# Patient Record
Sex: MALE | Race: WHITE
[De-identification: names, ages, dates, MRNs, and addresses within clinical notes are randomized per-mention and may not be internally consistent; named-entity substitution may affect disease eponyms.]

---

## 2019-07-19 ENCOUNTER — HOSPITAL ENCOUNTER (OUTPATIENT)
Dept: HOSPITAL 47 - ORWHC2ENDO | Age: 59
Discharge: HOME | End: 2019-07-19
Attending: SURGERY
Payer: COMMERCIAL

## 2019-07-19 VITALS — SYSTOLIC BLOOD PRESSURE: 122 MMHG | HEART RATE: 85 BPM | DIASTOLIC BLOOD PRESSURE: 62 MMHG

## 2019-07-19 VITALS — RESPIRATION RATE: 18 BRPM | TEMPERATURE: 97.2 F

## 2019-07-19 VITALS — BODY MASS INDEX: 31.5 KG/M2

## 2019-07-19 DIAGNOSIS — I10: ICD-10-CM

## 2019-07-19 DIAGNOSIS — Z79.899: ICD-10-CM

## 2019-07-19 DIAGNOSIS — E78.5: ICD-10-CM

## 2019-07-19 DIAGNOSIS — M72.2: ICD-10-CM

## 2019-07-19 DIAGNOSIS — M10.9: ICD-10-CM

## 2019-07-19 DIAGNOSIS — R19.5: Primary | ICD-10-CM

## 2019-07-19 DIAGNOSIS — K64.4: ICD-10-CM

## 2019-07-19 PROCEDURE — 45378 DIAGNOSTIC COLONOSCOPY: CPT

## 2019-07-19 NOTE — P.GSHP
History of Present Illness


H&P Date: 07/19/19


Chief Complaint: Screening colonoscopy





This is a 58-year-old male who presents today for screening colonoscopy.  

Patient denies a significant GI complaints.





Past Medical History


Past Medical History: Hyperlipidemia, Hypertension


Additional Past Medical History / Comment(s): rt foot gout.  pos. cologuard.  lt

foot plantar fasciitis


History of Any Multi-Drug Resistant Organisms: None Reported


Past Surgical History: No Surgical Hx Reported


Past Anesthesia/Blood Transfusion Reactions: No Reported Reaction


Additional Past Anesthesia/Blood Transfusion Reaction / Comment(s): has never 

had anesthesia


Smoking Status: Never smoker





- Past Family History


  ** Father


Family Medical History: Cancer





Medications and Allergies


                                Home Medications











 Medication  Instructions  Recorded  Confirmed  Type


 


Allopurinol [Zyloprim] 100 mg PO DAILY 07/17/19 07/17/19 History


 


Atorvastatin [Lipitor] 40 mg PO DAILY 07/17/19 07/17/19 History


 


Lisinopril 40 mg PO DAILY 07/17/19 07/17/19 History


 


Zolpidem [Ambien] 5 mg PO HS 07/17/19 07/17/19 History








                                    Allergies











Allergy/AdvReac Type Severity Reaction Status Date / Time


 


No Known Allergies Allergy   Verified 07/17/19 11:39














Surgical - Exam


                                   Vital Signs











Temp Pulse Resp BP Pulse Ox


 


 97.2 F L  89   18   168/92   96 


 


 07/19/19 07:09  07/19/19 07:09  07/19/19 07:09  07/19/19 07:09  07/19/19 07:09














- General


well developed, well nourished, no distress





- Eyes


PERRL





- ENT


normal pinna





- Neck


no masses





- Respiratory


normal expansion





- Cardiovascular


Rhythm: regular





- Abdomen


Abdomen: soft, non tender





Assessment and Plan


Assessment: 





We'll perform screening colonoscopy.

## 2019-07-19 NOTE — P.OP
Date of Procedure: 07/19/19


Preoperative Diagnosis: 


Screening colonoscopy


Postoperative Diagnosis: 


Normal colonoscopy





External hemorrhoids


Procedure(s) Performed: 


Colonoscopy


Anesthesia: MAC


Surgeon: Tigre Tran


Pathology: none sent


Condition: stable


Disposition: PACU


Description of Procedure: 





PROCEDURE:  The patient was placed on the endoscopy table in the lateral 

position.  Digital rectal examination was performed which revealed external 

hemorrhoids.  The prostate was symmetrical without nodules.  Flexible 

colonoscope was then placed in the patient's anus and passed throughout the 

entire colon.  The ileocecal valve was visualized.  The cecum, ascending, 

transverse, descending and sigmoid colon were normal.  The rectum was normal as 

well.  There were no masses, polyps or diverticula noted in the entire colon. 





SUMMARY OF FINDINGS:  


Normal colonoscopy.  External hemorrhoids.

## 2020-08-13 ENCOUNTER — HOSPITAL ENCOUNTER (EMERGENCY)
Dept: HOSPITAL 47 - EC | Age: 60
Discharge: HOME | End: 2020-08-13
Payer: COMMERCIAL

## 2020-08-13 VITALS — TEMPERATURE: 98.3 F | SYSTOLIC BLOOD PRESSURE: 131 MMHG | HEART RATE: 71 BPM | DIASTOLIC BLOOD PRESSURE: 72 MMHG

## 2020-08-13 VITALS — RESPIRATION RATE: 18 BRPM

## 2020-08-13 DIAGNOSIS — S93.401A: Primary | ICD-10-CM

## 2020-08-13 DIAGNOSIS — I10: ICD-10-CM

## 2020-08-13 DIAGNOSIS — Z87.891: ICD-10-CM

## 2020-08-13 DIAGNOSIS — E78.5: ICD-10-CM

## 2020-08-13 DIAGNOSIS — Z79.899: ICD-10-CM
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
## 2020-08-13 DIAGNOSIS — Z79.51: ICD-10-CM

## 2020-08-13 PROCEDURE — 29515 APPLICATION SHORT LEG SPLINT: CPT

## 2020-08-13 PROCEDURE — 99283 EMERGENCY DEPT VISIT LOW MDM: CPT

## 2020-08-13 NOTE — ED
Lower Extremity Injury HPI





- General


Chief Complaint: Extremity Injury, Lower


Stated Complaint: R Ankle Injury


Time Seen by Provider: 08/13/20 08:23


Source: patient, family


Mode of arrival: wheelchair


Limitations: no limitations





- History of Present Illness


Initial Comments: 





Patient is a 59-year-old male presenting to emergency Department with complaints

of right ankle pain.  Patient states yesterday he was working in his barn when 

his right ankle twisted over.  Patient states he did have a lot of pain 

immediately following the injury.  He states he was able to limp into the house.

 They did ice the ankle.  He states he woke up this morning and feels even more 

stiff and sore.  He wants to make sure he did not fracture anything.  He denies 

any previous surgeries or fractures of the right foot or ankle.  He denies pain 

anywhere else.  He has no further complaints.





- Related Data


                                Home Medications











 Medication  Instructions  Recorded  Confirmed


 


Allopurinol [Zyloprim] 100 mg PO DAILY 07/17/19 08/13/20


 


Atorvastatin [Lipitor] 40 mg PO DAILY 07/17/19 08/13/20


 


Zolpidem [Ambien] 5 mg PO HS 07/17/19 08/13/20


 


lisinopriL 40 mg PO DAILY 07/17/19 08/13/20


 


Fluticasone/Vilanterol [Breo 1 puff INHALATION RT-DAILY 08/13/20 08/13/20





Ellipta 200-25 Mcg INH]   











                                    Allergies











Allergy/AdvReac Type Severity Reaction Status Date / Time


 


No Known Allergies Allergy   Verified 08/13/20 09:34














Review of Systems


ROS Statement: 


Those systems with pertinent positive or pertinent negative responses have been 

documented in the HPI.





ROS Other: All systems not noted in ROS Statement are negative.





Past Medical History


Past Medical History: Hyperlipidemia, Hypertension


Additional Past Medical History / Comment(s): rt foot gout.  pos. cologuard.  lt

foot plantar fasciitis


History of Any Multi-Drug Resistant Organisms: None Reported


Past Surgical History: Appendectomy


Past Anesthesia/Blood Transfusion Reactions: No Reported Reaction


Additional Past Anesthesia/Blood Transfusion Reaction / Comment(s): has never 

had anesthesia


Past Psychological History: No Psychological Hx Reported


Smoking Status: Former smoker


Past Alcohol Use History: Rare


Past Drug Use History: None Reported





- Past Family History


  ** Father


Family Medical History: Cancer





General Exam





- General Exam Comments


Initial Comments: 





GENERAL: 


Patient is well-developed and well-nourished.  Patient is nontoxic and in no 

acute distress.





HEAD: 


Atraumatic, normocephalic.





EYES:


Pupils equal round and reactive to light, extraocular movements intact, sclera 

anicteric, conjunctiva are normal.  Eyelids were unremarkable.





ENT: 


Moist mucous membranes.





NECK: 


Normal range of motion, supple without lymphadenopathy or JVD.





LUNGS:


Unlabored respirations.  Breath sounds clear to auscultation bilaterally and 

equal.  No wheezes rales or rhonchi.





HEART:


Regular rate and rhythm without murmurs, rubs or gallops.





ABDOMEN: 


Soft, nontender, normoactive bowel sounds.  No guarding, no rebound.  No masses 

appreciated.





: Deferred 





MUSCULOSKELETAL: 


Patient has pain with palpation of the lateral malleolus of the right ankle, 

there is some mild swelling present.  He does have full ankle range of motion 

with pain at the end range.  He denies any pain of the right foot or right lower

leg.  There is no overlying erythema.  He is neurovascular intact.  





NEUROLOGICAL: 


Patient is alert and oriented x 3.    Normal speech   





PSYCH:


Normal mood, normal affect.





SKIN:


 Warm, Dry, normal turgor, no rashes or lesions noted.


Limitations: no limitations





Course


                                   Vital Signs











  08/13/20 08/13/20





  08:15 09:59


 


Temperature 97.8 F 98.3 F


 


Pulse Rate 77 71


 


Respiratory 18 18





Rate  


 


Blood Pressure 147/92 131/72


 


O2 Sat by Pulse 98 98





Oximetry  














Procedures





- Orthopedic Splinting/Casting


  ** Injury #1


Side: right


Lower Extremity Injury Location: ankle


Lower Extremity Immobilizer: stirrup splint





Medical Decision Making





- Medical Decision Making





Patient is a 59-year-old male here for right ankle pain after twisting it 

yesterday.  Denies any previous surgeries or injuries.  X-rays reveal no acute 

fractures/dislocations.  I discussed these findings with the patient.  I 

recommended ice, ibuprofen for discomfort.  Patient was given an ankle stirrup 

brace for support.  Work note was given.  Patient is in agreement with this plan

of care.  He will follow up with his PCP if symptoms persist.  He is stable for 

discharge.





Disposition


Clinical Impression: 


 Right ankle sprain





Disposition: HOME SELF-CARE


Condition: Stable


Instructions (If sedation given, give patient instructions):  Ankle Sprain (ED)


Additional Instructions: 


Please return to the Emergency Department if symptoms worsen or any other 

concerns.


Use ice to the area, ibuprofen for discomfort.  


Recommended supportive shoes, may also try ankle brace for support for the next 

few weeks.


Follow-up with PCP if symptoms persist.


Is patient prescribed a controlled substance at d/c from ED?: No


Referrals: 


Rosalva Molina DO [Primary Care Provider] - 1-2 days

## 2020-08-13 NOTE — XR
EXAMINATION TYPE: XR ankle complete RT

 

DATE OF EXAM: 8/13/2020

 

COMPARISON: NONE

 

HISTORY: 59-year-old male pain and swelling after injury

 

TECHNIQUE: 3 views

 

FINDINGS: Mild circumferential swelling of the ankle. Moderate-sized plantar calcaneal spur. Ankle mo
rtise is congruent with preservation of the distal tibiofibular overlap. Talar dome is intact. No acu
te fracture, subluxation, dislocation seen. Mild fusiform thickening of the middle third Achilles ten
don.

 

IMPRESSION: 

1. Some soft tissue swelling without acute osseous abnormality seen.

2. Mild fusiform thickening of the middle third Achilles tendon suggests underlying tendinopathy.